# Patient Record
Sex: MALE | Race: WHITE | Employment: FULL TIME | URBAN - METROPOLITAN AREA
[De-identification: names, ages, dates, MRNs, and addresses within clinical notes are randomized per-mention and may not be internally consistent; named-entity substitution may affect disease eponyms.]

---

## 2018-10-12 ENCOUNTER — THERAPY VISIT (OUTPATIENT)
Dept: PHYSICAL THERAPY | Facility: CLINIC | Age: 43
End: 2018-10-12
Payer: COMMERCIAL

## 2018-10-12 DIAGNOSIS — M94.0 COSTOCHONDRITIS: Primary | ICD-10-CM

## 2018-10-12 PROCEDURE — 97140 MANUAL THERAPY 1/> REGIONS: CPT | Mod: GP | Performed by: PHYSICAL THERAPIST

## 2018-10-12 PROCEDURE — 97161 PT EVAL LOW COMPLEX 20 MIN: CPT | Mod: GP | Performed by: PHYSICAL THERAPIST

## 2018-10-12 PROCEDURE — 97110 THERAPEUTIC EXERCISES: CPT | Mod: GP | Performed by: PHYSICAL THERAPIST

## 2018-10-12 NOTE — PROGRESS NOTES
[facility] Initial Evaluation  Subjective:  Patient is a 42 year old male presenting with rehab cervical spine hpi.   William Hill is a 42 year old male with a thoracic spine condition.  Condition occurred with:  Insidious onset.  Condition occurred: at home.  This is a new and recurrent condition  10-1-18 pt saw MD for referral for thoracic/rib pain..    Patient reports pain:  Thoracic left side and thoracic right side.  Radiates to:  Other (thoracic spine to anterior chest).  Pain is described as aching and is intermittent and reported as 2/10 and 8/10 (has been to urgent care 2x for this).  Associated symptoms:  Loss of motion/stiffness. Pain is the same all the time.  Symptoms are exacerbated by nothing and relieved by nothing.  Since onset symptoms are unchanged.  Special tests:  CT scan (pt reports seeing cardiologist who cleared cardiac involvement).      General health as reported by patient is good.  Pertinent medical history includes:  Heart problems and smoking.  Medical allergies: no.  Other surgeries include:  Heart surgery (cardiac stents x2 2016).  Current medications:  Muscle relaxants and high blood pressure medication.  Current occupation is .  Patient is working in normal job with restrictions.  Primary job tasks include:  Prolonged sitting.    Barriers include:  None as reported by the patient.    Red flags:  Chest pain.                        Objective:          THORACIC:    Posture: slouched sitting posture, able to correct with cues but quickly falls back to slouched sitting posture    Neurological:    Motor Deficit:  Myotomes L R   C4 (shoulder elevation) 5/5 5/5   C5 (shoulder abduction) 5/5 5/5   C6 (elbow flexion) 5/5 5/5   C7 (elbow extension) 5/5 5/5   C8 (thumb extension)     T1 (finger add/abd)      Strength (lb)     MMT B lat, mid trap, rhomboid 4+/5 bilaterally    Sensory Deficit, Reflexes, Dural Signs: normal light touch  sensation    AROM: (Major, Moderate, Minimal or Nil loss)  Movement Loss Brandon Mod Min Nil Pain   Protrusion        Flexion    x    Retraction    x    Extension    x    Left Rotation    x    Right Rotation    x    Left Side Bending    x    Right Side bending    x    Limited B thoracic rotation and extension    Repeated movement testing:   Static Tests: hypomobile T4-9  Other Tests: TTP T6-8        System    Physical Exam    General     ROS    Assessment/Plan:    Patient is a 42 year old male with thoracic complaints.    Patient has the following significant findings with corresponding treatment plan.                Diagnosis 1:  Costochondritis,chest wall pain  Pain -  hot/cold therapy, US, electric stimulation, manual therapy, education and home program  Decreased ROM/flexibility - manual therapy, therapeutic exercise, therapeutic activity and home program  Decreased joint mobility - manual therapy, therapeutic exercise, therapeutic activity and home program  Decreased strength - therapeutic exercise, therapeutic activities and home program  Impaired posture - neuro re-education, therapeutic activities and home program    Therapy Evaluation Codes:   1) History comprised of:   Personal factors that impact the plan of care:      None.    Comorbidity factors that impact the plan of care are:      None.     Medications impacting care: None.  2) Examination of Body Systems comprised of:   Body structures and functions that impact the plan of care:      Thoracic Spine.   Activity limitations that impact the plan of care are:      Sitting.  3) Clinical presentation characteristics are:   Stable/Uncomplicated.  4) Decision-Making    Low complexity using standardized patient assessment instrument and/or measureable assessment of functional outcome.  Cumulative Therapy Evaluation is: Low complexity.    Previous and current functional limitations:  (See Goal Flow Sheet for this information)    Short term and Long term goals:  (See Goal Flow Sheet for this information)     Communication ability:  Patient appears to be able to clearly communicate and understand verbal and written communication and follow directions correctly.  Treatment Explanation - The following has been discussed with the patient:   RX ordered/plan of care  Anticipated outcomes  Possible risks and side effects  This patient would benefit from PT intervention to resume normal activities.   Rehab potential is good.    Frequency:  1 X every 2 weeks, once daily  Duration:  for 12 weeks, 6 visits  Discharge Plan:  Achieve all LTG.  Independent in home treatment program.  Reach maximal therapeutic benefit.    Please refer to the daily flowsheet for treatment today, total treatment time and time spent performing 1:1 timed codes.

## 2018-10-12 NOTE — LETTER
[facility]  [address]  [place] [address]  [phone]    2018    Re: William Hill   :   1975  MRN:  [id number]   REFERRING PHYSICIAN:   [facility]    Date of Initial Evaluation:  10/11/18  Visits:  Rxs Used: 1  Reason for Referral:  [facility] Initial Evaluation    Subjective:  Patient is a 42 year old male presenting with rehab cervical spine hpi.   William Hill is a 42 year old male with a thoracic spine condition.  Condition occurred with:  Insidious onset.  Condition occurred: at home.  This is a new and recurrent condition  10-1-18 pt saw MD for referral for thoracic/rib pain..    Patient reports pain:  Thoracic left side and thoracic right side.  Radiates to:  Other (thoracic spine to anterior chest).  Pain is described as aching and is intermittent and reported as 2/10 and 8/10 (has been to urgent care 2x for this).  Associated symptoms:  Loss of motion/stiffness. Pain is the same all the time.  Symptoms are exacerbated by nothing and relieved by nothing.  Since onset symptoms are unchanged.  Special tests:  CT scan (pt reports seeing cardiologist who cleared cardiac involvement).      General health as reported by patient is good.  Pertinent medical history includes:  Heart problems and smoking.  Medical allergies: no.  Other surgeries include:  Heart surgery (cardiac stents x2 2016).  Current medications:  Muscle relaxants and high blood pressure medication.  Current occupation is .  Patient is working in normal job with restrictions.  Primary job tasks include:  Prolonged sitting.  Barriers include:  None as reported by the patient.  Red flags:  Chest pain.    Objective:    THORACIC:    Posture: slouched sitting posture, able to correct with cues but quickly falls back to slouched sitting posture    Neurological:    Motor Deficit:  Myotomes L R    C4 (shoulder elevation) 5/5 5/5   C5 (shoulder abduction) 5/5 5/5   C6 (elbow flexion) 5/5 5/5   C7 (elbow extension) 5/5 5/5   C8 (thumb extension)     T1 (finger add/abd)      Strength (lb)     MMT B lat, mid trap, rhomboid 4+/5 bilaterally    Sensory Deficit, Reflexes, Dural Signs: normal light touch sensation    AROM: (Major, Moderate, Minimal or Nil loss)  Movement Loss Brandon Mod Min Nil Pain   Protrusion        Flexion    x    Retraction    x    Extension    x    Left Rotation    x    Right Rotation    x    Left Side Bending    x    Right Side bending    x    Limited B thoracic rotation and extension    Repeated movement testing:   Static Tests: hypomobile T4-9  Other Tests: TTP T6-8    Assessment/Plan:    Patient is a 42 year old male with thoracic complaints.    Patient has the following significant findings with corresponding treatment plan.                Diagnosis 1:  Costochondritis,chest wall pain  Pain -  hot/cold therapy, US, electric stimulation, manual therapy, education and home program  Decreased ROM/flexibility - manual therapy, therapeutic exercise, therapeutic activity and home program  Decreased joint mobility - manual therapy, therapeutic exercise, therapeutic activity and home program  Decreased strength - therapeutic exercise, therapeutic activities and home program  Impaired posture - neuro re-education, therapeutic activities and home program    Therapy Evaluation Codes:   1) History comprised of:   Personal factors that impact the plan of care:      None.    Comorbidity factors that impact the plan of care are:      None.     Medications impacting care: None.  2) Examination of Body Systems comprised of:   Body structures and functions that impact the plan of care:      Thoracic Spine.   Activity limitations that impact the plan of care are:      Sitting.  3) Clinical presentation characteristics are:   Stable/Uncomplicated.  4) Decision-Making    Low complexity using standardized  patient assessment instrument and/or measureable assessment of functional outcome.    Cumulative Therapy Evaluation is: Low complexity.  Previous and current functional limitations:  (See Goal Flow Sheet for this information)    Short term and Long term goals: (See Goal Flow Sheet for this information)   Communication ability:  Patient appears to be able to clearly communicate and understand verbal and written communication and follow directions correctly.  Treatment Explanation - The following has been discussed with the patient:   RX ordered/plan of care  Anticipated outcomes  Possible risks and side effects  This patient would benefit from PT intervention to resume normal activities.   Rehab potential is good.    Frequency:  1 X every 2 weeks, once daily  Duration:  for 12 weeks, 6 visits  Discharge Plan:  Achieve all LTG.  Independent in home treatment program.  Reach maximal therapeutic benefit.    Thank you for your referral.    INQUIRIES  Therapist: [facility]  [address] [place] [address]  Phone: [phone]  Fax: [phone]

## 2018-10-12 NOTE — MR AVS SNAPSHOT
"              After Visit Summary   10/12/2018    William Hill    MRN: [id number]           Patient Information     Date Of Birth          1975        Visit Information        Provider Department      10/12/2018 9:40 AM Reji Aranda PT [facility]        Today's Diagnoses     Costochondritis    -  1       Follow-ups after your visit        Your next 10 appointments already scheduled     Oct 26, 2018  9:40 AM CDT   [facility] Ext[facility] with [facility] ([facility])    [address]  [place] [address] [phone]              Who to contact     If you have questions or need follow up information about today's clinic visit or your schedule please contact [facility] directly at [phone].  Normal or non-critical lab and imaging results will be communicated to you by Bababo[employer], letter or phone within 4 business days after the clinic has received the results. If you do not hear from us within 7 days, please contact the clinic through Bababoohart or phone. If you have a critical or abnormal lab result, we will notify you by phone as soon as possible.  Submit refill requests through [employer] or call your pharmacy and they will forward the refill request to us. Please allow 3 business days for your refill to be completed.          Additional Information About Your Visit        MyChart Information     [employer] lets you send messages to your doctor, view your test results, renew your prescriptions, schedule appointments and more. To sign up, go to [email or web address] . Click on \"Log in\" on the left side of the screen, which will take you to the Welcome page. Then click on \"Sign up Now\" on the right side of the page.     You will be asked to enter the access code listed below, as well as some personal information. Please follow the directions to create your username and password.     Your access code is: [email or web address]  Expires: 1/10/2019 " 11:44 AM     Your access code will  in 90 days. If you need help or a new code, please call your [facility] or [phone].        Care EveryWhere ID     This is your Care EveryWhere ID. This could be used by other organizations to access your [place] medical records  [id number]         Blood Pressure from Last 3 Encounters:   No data found for BP    Weight from Last 3 Encounters:   No data found for Wt              We Performed the Following     HC PT EVAL, LOW COMPLEXITY     [facility] INITIAL EVAL REPORT     MANUAL THER TECH,1+REGIONS,EA 15 MIN     THERAPEUTIC EXERCISES        Primary Care Provider Office Phone # Fax #    [facility] [phone]       [address] [place] [address] [address]  [facility] [address]        Equal Access to Services     KENYATTA CABRERA : Hadii juanjose ku hadasho Soomaali, waaxda luqadaha, qaybta kaalmada adeegyada, waxay essiein flakito chandler. So [facility] [phone].    ATENCIÓN: Si habla español, tiene a richardson disposición servicios gratuitos de asistencia lingüística. [facility] [phone].    We comply with applicable federal civil rights laws and [place] laws. We do not discriminate on the basis of race, color, national origin, age, disability, sex, sexual orientation, or gender identity.            Thank you!     Thank you for choosing [facility]  for your care. Our goal is always to provide you with excellent care. Hearing back from our patients is one way we can continue to improve our services. Please take a few minutes to complete the written survey that you may receive in the mail after your visit with us. Thank you!             Your Updated Medication List - Protect others around you: Learn how to safely use, store and throw away your medicines at www.disposemymeds.org.      Notice  As of 10/12/2018 11:44 AM    You have not been prescribed any medications.

## 2018-10-26 ENCOUNTER — THERAPY VISIT (OUTPATIENT)
Dept: PHYSICAL THERAPY | Facility: CLINIC | Age: 43
End: 2018-10-26
Payer: COMMERCIAL

## 2018-10-26 DIAGNOSIS — M94.0 COSTOCHONDRITIS: ICD-10-CM

## 2018-10-26 PROCEDURE — 97110 THERAPEUTIC EXERCISES: CPT | Mod: GP | Performed by: PHYSICAL THERAPIST

## 2018-10-26 PROCEDURE — 97112 NEUROMUSCULAR REEDUCATION: CPT | Mod: GP | Performed by: PHYSICAL THERAPIST

## 2018-10-26 PROCEDURE — 97140 MANUAL THERAPY 1/> REGIONS: CPT | Mod: GP | Performed by: PHYSICAL THERAPIST

## 2018-11-09 ENCOUNTER — THERAPY VISIT (OUTPATIENT)
Dept: PHYSICAL THERAPY | Facility: CLINIC | Age: 43
End: 2018-11-09
Payer: COMMERCIAL

## 2018-11-09 DIAGNOSIS — M94.0 COSTOCHONDRITIS: ICD-10-CM

## 2018-11-09 PROCEDURE — 97110 THERAPEUTIC EXERCISES: CPT | Mod: GP | Performed by: PHYSICAL THERAPIST

## 2018-11-09 PROCEDURE — 97140 MANUAL THERAPY 1/> REGIONS: CPT | Mod: GP | Performed by: PHYSICAL THERAPIST

## 2018-11-09 PROCEDURE — 97112 NEUROMUSCULAR REEDUCATION: CPT | Mod: GP | Performed by: PHYSICAL THERAPIST

## 2018-11-09 NOTE — MR AVS SNAPSHOT
"              After Visit Summary   11/9/2018    William Hill    MRN: [id number]           Patient Information     Date Of Birth          1975        Visit Information        Provider Department      11/9/2018 9:40 AM Reji Aranda, [facility]        Today's Diagnoses     Costochondritis           Follow-ups after your visit        Your next 10 appointments already scheduled     Nov 23, 2018  9:40 AM CST   [facility] ([facility])    [address]  [place] [address] [phone]              Who to contact     If you have questions or need follow up information about today's clinic visit or your schedule please contact [facility] directly at [phone].  Normal or non-critical lab and imaging results will be communicated to you by MyChart, letter or phone within 4 business days after the clinic has received the results. If you do not hear from us within 7 days, please contact the clinic through MyChart or phone. If you have a critical or abnormal lab result, we will notify you by phone as soon as possible.  Submit refill requests through [employer] or call your pharmacy and they will forward the refill request to us. Please allow 3 business days for your refill to be completed.          Additional Information About Your Visit        MyChart Information     [employer] lets you send messages to your doctor, view your test results, renew your prescriptions, schedule appointments and more. To sign up, go to [email or web address] . Click on \"Log in\" on the left side of the screen, which will take you to the Welcome page. Then click on \"Sign up Now\" on the right side of the page.     You will be asked to enter the access code listed below, as well as some personal information. Please follow the directions to create your username and password.     Your access code is: [email or web address]  Expires: 1/10/2019 10:44 " AM     Your access code will  in 90 days. If you need help or a new code, please call your [facility] clinic or [phone].        Care EveryWhere ID     This is your Care EveryWhere ID. This could be used by other organizations to access your [facility] medical records  [id number]         Blood Pressure from Last 3 Encounters:   No data found for BP    Weight from Last 3 Encounters:   No data found for Wt              We Performed the Following     MANUAL THER TECH,1+REGIONS,EA 15 MIN     NEUROMUSCULAR RE-EDUCATION     THERAPEUTIC EXERCISES        Primary Care Provider Office Phone # Fax #    [facility] [phone]       [address] [place] [address] [address]  [facility] [address]        Equal Access to Services     KENYATTA CABRERA : Hadii juanjose bhatto Soharlan, waaxda luqadaha, qaybta kaalmada adeegyada, amy chandler. So [facility] [phone].    ATENCIÓN: Si habla español, tiene a richardson disposición servicios gratuitos de asistencia lingüística. Llame al [phone].    We comply with applicable federal civil rights laws and [place] laws. We do not discriminate on the basis of race, color, national origin, age, disability, sex, sexual orientation, or gender identity.            Thank you!     Thank you for choosing [facility]  for your care. Our goal is always to provide you with excellent care. Hearing back from our patients is one way we can continue to improve our services. Please take a few minutes to complete the written survey that you may receive in the mail after your visit with us. Thank you!             Your Updated Medication List - Protect others around you: Learn how to safely use, store and throw away your medicines at www.disposemymeds.org.      Notice  As of 2018 10:26 AM    You have not been prescribed any medications.

## 2019-01-11 PROBLEM — M94.0 COSTOCHONDRITIS: Status: RESOLVED | Noted: 2018-10-12 | Resolved: 2019-01-11

## 2019-01-11 NOTE — PROGRESS NOTES
Subjective:  HPI                    Objective:  System    Physical Exam    General     ROS    Assessment/Plan:    DISCHARGE REPORT    Progress reporting period is from 10/12/18 to 11/9/18.     SUBJECTIVE  Subjective: pt reports no change and chest pain over the past few days has been worse. admits to having an near syncopal episode two days ago resulting in nausea and severe dizziness after standing up from supine position. admits he takes BP medication but has had low pressures in the past. pt reports pain is in L shoulder blade recently, has not been taking muscle relaxant.   Current Pain level: 3/10   Initial Pain level: 8/10   Changes in function: No changes noted in function since last SOAP note   Adverse reactions: None;   ,     The objective findings are from DOS 10/12/18.    OBJECTIVE     THORACIC:     Posture: slouched sitting posture, able to correct with cues but quickly falls back to slouched sitting posture     Neurological:     Motor Deficit:  Myotomes L R   C4 (shoulder elevation) 5/5 5/5   C5 (shoulder abduction) 5/5 5/5   C6 (elbow flexion) 5/5 5/5   C7 (elbow extension) 5/5 5/5   C8 (thumb extension)       T1 (finger add/abd)        Strength (lb)       MMT B lat, mid trap, rhomboid 4+/5 bilaterally     Sensory Deficit, Reflexes, Dural Signs: normal light touch sensation     AROM: (Major, Moderate, Minimal or Nil loss)  Movement Loss Brandon Mod Min Nil Pain   Protrusion             Flexion       x     Retraction       x     Extension       x     Left Rotation       x     Right Rotation       x     Left Side Bending       x     Right Side bending       x     Limited B thoracic rotation and extension     Repeated movement testing:   Static Tests: hypomobile T4-9  Other Tests: TTP T6-8      ASSESSMENT/PLAN  Updated problem list and treatment plan: Diagnosis 1:  costochondritis  STG/LTGs have been met or progress has been made towards goals:  None  Assessment of Progress: The patient's condition is  unchanged.  Self Management Plans:  Patient is independent in a home treatment program.  William continues to require the following intervention to meet STG and LTG's: further evaluation of continued chest pain.  We will discharge this patient from PT.    Recommendations:  Pt was seen for 3 PT visits without change, pt would benefit from further evaluation of continued chest pain. Plan to discharge PT services.    Please refer to the daily flowsheet for treatment today, total treatment time and time spent performing 1:1 timed codes.